# Patient Record
Sex: FEMALE | Race: BLACK OR AFRICAN AMERICAN | NOT HISPANIC OR LATINO | Employment: FULL TIME | URBAN - METROPOLITAN AREA
[De-identification: names, ages, dates, MRNs, and addresses within clinical notes are randomized per-mention and may not be internally consistent; named-entity substitution may affect disease eponyms.]

---

## 2022-07-11 ENCOUNTER — OFFICE VISIT (OUTPATIENT)
Dept: URGENT CARE | Facility: URGENT CARE | Age: 37
End: 2022-07-11
Payer: COMMERCIAL

## 2022-07-11 VITALS
SYSTOLIC BLOOD PRESSURE: 124 MMHG | DIASTOLIC BLOOD PRESSURE: 79 MMHG | TEMPERATURE: 98.1 F | WEIGHT: 166.21 LBS | HEIGHT: 64 IN | HEART RATE: 80 BPM | OXYGEN SATURATION: 100 % | BODY MASS INDEX: 28.38 KG/M2

## 2022-07-11 DIAGNOSIS — R21 RASH AND NONSPECIFIC SKIN ERUPTION: Primary | ICD-10-CM

## 2022-07-11 PROCEDURE — 99203 OFFICE O/P NEW LOW 30 MIN: CPT | Performed by: EMERGENCY MEDICINE

## 2022-07-11 RX ORDER — TRIAMCINOLONE ACETONIDE 1 MG/G
OINTMENT TOPICAL 2 TIMES DAILY
Qty: 30 G | Refills: 0 | Status: SHIPPED | OUTPATIENT
Start: 2022-07-11 | End: 2022-07-21

## 2022-07-11 NOTE — PROGRESS NOTES
Assessment & Plan     Diagnosis:    (R21) Rash and nonspecific skin eruption  (primary encounter diagnosis)  Plan: triamcinolone (KENALOG) 0.1 % external ointment      Medical Decision Making:  Azalea Damon is a 36 year old female presents today with concern of a rash on the bilateral forearms, and the back of the neck. the rash is slightly raised and scaly, non-petechial, non-pruritic.  Slightly painful; no vesicular appearance and given bilateral nature a very low suspicion for shingles at this time.  The patient is nontoxic and well-appearing.  I suspect this is a rash due to contact dermatitis environmental cause.  Swimming, insect bites or stings, signs of angioedema or urticaria to suggest allergic reaction.  Topical steroid cream and symptomatic care is recommended. Close follow-up with the patient's PMD was recommended.  I also recommended going to the ER if any worsening, high fever, sores in the mouth, difficulty breathing or any worsening.      Patient voices understanding and agreement with the plan including reasons to go to the ER immediately as well as to be seen by a more consistent care-giver, such as their PCP, if the symptoms persist more than 3 days.     Joon Kothari PA-C  [facility]    Subjective     Azalea Damon is a 36 year old female who presents to clinic today for the following health issues:  Chief Complaint   Patient presents with     Rash     Rash on arms and behind neck. Patient will also like to establish care.       HPI    Rash    Onset of rash was 1 week ago.   Course of illness is gradual onset.  Severity moderate  Current and Associated symptoms: itching, painful and red   Location of the rash: Bilateral arms and nape of neck.  Previous history of a similar rash? No  Recent exposure history: none known  Denies exposure to: environmental allergens, impetigo, medications, new household products, new skincare products and tick (deer tick), insect bites or  "stings, swimming in lakes.  Associated symptoms include: none; just the rash.    Patient denies any throat tightness, sore throat, wheezing, shortness of breath, tongue/lip swelling and fever.  Treatment measures tried include: moisturizer    Review of Systems    See HPI    Objective      Vitals: /79 (BP Location: Left arm, Patient Position: Sitting, Cuff Size: Adult Regular)   Pulse 80   Temp 98.1  F (36.7  C) (Tympanic)   Ht 1.626 m (5' 4\")   Wt 75.4 kg (166 lb 3.4 oz)   SpO2 100%   BMI 28.53 kg/m    Resp: 18    Patient Vitals for the past 24 hrs:   BP Temp Temp src Pulse SpO2 Height Weight   07/11/22 1615 124/79 98.1  F (36.7  C) Tympanic 80 100 % 1.626 m (5' 4\") 75.4 kg (166 lb 3.4 oz)       Vital signs reviewed by: Joon Kothari PA-C    Physical Exam   Constitutional: Patient is alert and cooperative. No acute distress.  Mouth: Mucous membranes are moist. Normal tongue and tonsil. Posterior oropharynx is clear.  Eyes: Conjunctivae, EOMI and lids are normal. PERRL.  Cardiovascular: Regular rate and rhythm  Pulmonary/Chest: Lungs are clear to auscultation throughout. Effort normal. No respiratory distress. No wheezes, rales or rhonchi.  Neurological: Alert and oriented x3.   Skin: Tiny papular rash on an erythematous base (blanching) over the bilateral forearms, upper arms and nape of neck.  No vesicles, fluctuance, or areas of pointing.  No petechiae, purpura.  No mucous membrane or palm involvement.  Psychiatric:The patient has a normal mood and affect.       Joon Kothari PA-C, July 11, 2022      "

## 2022-07-11 NOTE — PATIENT INSTRUCTIONS
Follow up with regular PCP in 2 weeks for recheck if not improving. Do not use the steroid cream for longer than 14 days. Go to the ER with swelling/rash of the eyes, mouth, fevers, difficulties swallowing or breathing.

## 2023-01-26 ENCOUNTER — NURSE TRIAGE (OUTPATIENT)
Dept: FAMILY MEDICINE | Facility: CLINIC | Age: 38
End: 2023-01-26
Payer: COMMERCIAL

## 2023-01-26 NOTE — TELEPHONE ENCOUNTER
"Patient calling about vaginal bleeding, states on Monday and Tuesday was soaking more than 2 pads an hour but today is having very little bleeding.    Patient is wanting to be seen today or is thinking of going to urgent care but would prefer appointment in order to figure out why she had this episode of heavy bleeding.    Reason for Disposition    Patient wants to be seen    Additional Information    Negative: SEVERE vaginal bleeding (e.g., continuous red blood from vagina, or large blood clots) and very weak (can't stand)    Negative: Passed out (i.e., fainted, collapsed and was not responding)    Negative: Difficult to awaken or acting confused (e.g., disoriented, slurred speech)    Negative: Shock suspected (e.g., cold/pale/clammy skin, too weak to stand, low BP, rapid pulse)    Negative: Sounds like a life-threatening emergency to the triager    Negative: Pregnant 20 or more weeks (5 months or more)    Negative: Pregnant < 20 weeks (less than 5 months)    Negative: Postpartum (from 0 to 6 weeks after delivery)    Negative: Vaginal discharge is main symptom and bleeding is slight    Negative: SEVERE abdominal pain (e.g., excruciating)    Negative: SEVERE dizziness (e.g., unable to stand, requires support to walk, feels like passing out now)    Negative: SEVERE vaginal bleeding (e.g., soaking 2 pads or tampons per hour and present 2 or more hours; 1 menstrual cup every 2 hours)    Negative: MODERATE vaginal bleeding (i.e., soaking pad or tampon per hour and present > 6 hours; 1 menstrual cup every 6 hours)    Negative: Pale skin (pallor) of new-onset or worsening    Negative: Constant abdominal pain lasting > 2 hours    Negative: Patient sounds very sick or weak to the triager    Answer Assessment - Initial Assessment Questions  1. AMOUNT: \"Describe the bleeding that you are having.\"     - SPOTTING: spotting, or pinkish / brownish mucous discharge; does not fill panty liner or pad     - MILD:  less than 1 pad / " "hour; less than patient's usual menstrual bleeding    - MODERATE: 1-2 pads / hour; 1 menstrual cup every 6 hours; small-medium blood clots (e.g., pea, grape, small coin)    - SEVERE: soaking 2 or more pads/hour for 2 or more hours; 1 menstrual cup every 2 hours; bleeding not contained by pads or continuous red blood from vagina; large blood clots (e.g., golf ball, large coin)       Less than 1 pad an hour today, Monday and Tuesday was more than 2 pads per hour.  2. ONSET: \"When did the bleeding begin?\" \"Is it continuing now?\"      Started 1/20, and was still bleeding yesterday but today there is almost none.  3. MENSTRUAL PERIOD: \"When was the last normal menstrual period?\" \"How is this different than your period?\"      12/28 and this is different as it was heavier on Monday and Tuesday.   4. REGULARITY: \"How regular are your periods?\"      States abnormal cycles are normal for her as far as dates changing.  5. ABDOMINAL PAIN: \"Do you have any pain?\" \"How bad is the pain?\"  (e.g., Scale 1-10; mild, moderate, or severe)    - MILD (1-3): doesn't interfere with normal activities, abdomen soft and not tender to touch     - MODERATE (4-7): interferes with normal activities or awakens from sleep, abdomen tender to touch     - SEVERE (8-10): excruciating pain, doubled over, unable to do any normal activities       None  6. PREGNANCY: \"Could you be pregnant?\" \"Are you sexually active?\" \"Did you recently give birth?\"      No  7. BREASTFEEDING: \"Are you breastfeeding?\"      No  8. HORMONES: \"Are you taking any hormone medications, prescription or OTC?\" (e.g., birth control pills, estrogen)      No  9. BLOOD THINNERS: \"Do you take any blood thinners?\" (e.g., Coumadin/warfarin, Pradaxa/dabigatran, aspirin)      No  10. CAUSE: \"What do you think is causing the bleeding?\" (e.g., recent gyn surgery, recent gyn procedure; known bleeding disorder, cervical cancer, polycystic ovarian disease, fibroids)          Unsure, doesn't feel " "it is from a menstrual cycle.  11. HEMODYNAMIC STATUS: \"Are you weak or feeling lightheaded?\" If Yes, ask: \"Can you stand and walk normally?\"         No  12. OTHER SYMPTOMS: \"What other symptoms are you having with the bleeding?\" (e.g., passed tissue, vaginal discharge, fever, menstrual-type cramps)        No    Protocols used: VAGINAL BLEEDING - [id number]    Juan F Hernandez RN   [facility]    " [facility] Counseling:  I discussed with the patient the risks of golimumab including but not limited to myelosuppression, immunosuppression, autoimmune hepatitis, demyelinating diseases, lymphoma, and serious infections.  The patient understands that monitoring is required including a PPD at baseline and must alert us or the primary physician if symptoms of infection or other concerning signs are noted.

## 2023-01-26 NOTE — TELEPHONE ENCOUNTER
RN calling patient. Patient has not been seen at the [facility] and does not currently have a PCP. RN unable to get patient scheduled for sooner visit at this time.     Advised patient to keep visit on 2/23/23 with Dr. Blackburn. RN advised patient to be seen in urgent care with current concerns. RN advised patient to be seen in the emergency room if having severe bleeding. Patient verbalizes understanding and denies further concerns at this time.     OLIVIA HollisN, RN  [facility]

## 2023-05-01 ENCOUNTER — TELEPHONE (OUTPATIENT)
Dept: FAMILY MEDICINE | Facility: CLINIC | Age: 38
End: 2023-05-01
Payer: COMMERCIAL
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
## 2023-05-01 NOTE — TELEPHONE ENCOUNTER
I have never seen this patient. She will have to schedule with any available provider.  Thank you,  Malgorzata Taveras MD MPH

## 2023-05-04 ENCOUNTER — OFFICE VISIT (OUTPATIENT)
Dept: FAMILY MEDICINE | Facility: CLINIC | Age: 38
End: 2023-05-04
Payer: COMMERCIAL

## 2023-05-04 VITALS
SYSTOLIC BLOOD PRESSURE: 112 MMHG | RESPIRATION RATE: 14 BRPM | WEIGHT: 161 LBS | DIASTOLIC BLOOD PRESSURE: 71 MMHG | BODY MASS INDEX: 27.49 KG/M2 | OXYGEN SATURATION: 100 % | HEIGHT: 64 IN | TEMPERATURE: 98.8 F | HEART RATE: 79 BPM

## 2023-05-04 DIAGNOSIS — M25.561 ACUTE PAIN OF BOTH KNEES: ICD-10-CM

## 2023-05-04 DIAGNOSIS — R07.89 CHEST WALL PAIN: ICD-10-CM

## 2023-05-04 DIAGNOSIS — S60.222D CONTUSION OF LEFT HAND, SUBSEQUENT ENCOUNTER: ICD-10-CM
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
## 2023-05-04 DIAGNOSIS — M25.562 ACUTE PAIN OF BOTH KNEES: ICD-10-CM

## 2023-05-04 PROCEDURE — 99213 OFFICE O/P EST LOW 20 MIN: CPT | Performed by: FAMILY MEDICINE

## 2023-05-04 ASSESSMENT — PATIENT HEALTH QUESTIONNAIRE - PHQ9
SUM OF ALL RESPONSES TO PHQ QUESTIONS 1-9: 15
SUM OF ALL RESPONSES TO PHQ QUESTIONS 1-9: 15
10. IF YOU CHECKED OFF ANY PROBLEMS, HOW DIFFICULT HAVE THESE PROBLEMS MADE IT FOR YOU TO DO YOUR WORK, TAKE CARE OF THINGS AT HOME, OR GET ALONG WITH OTHER PEOPLE: EXTREMELY DIFFICULT

## 2023-05-04 ASSESSMENT — PAIN SCALES - GENERAL: PAINLEVEL: EXTREME PAIN (8)
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
## 2023-05-04 NOTE — PATIENT INSTRUCTIONS
At [facility], we strive to deliver an exceptional experience to you, every time we see you. If you receive a survey, please complete it as we do value your feedback.  If you have MyChart, you can expect to receive results automatically within 24 hours of their completion.  Your provider will send a note interpreting your results as well.   If you do not have MyChart, you should receive your results in about a week by mail.    Your care team:                            Family Medicine Internal Medicine   MD Surjit Kevin MD Shantel Branch-Fleming, MD Srinivasa Vaka, MD Katya Belousova, PARHEA Richards CNP, MD ([facility]   Rodrick Acevedo, MD Christine Talley MD Amelia Massimini APRN IMELDA Soler APRN MD Abdirashid Gnosalves MD          Clinic hours: Monday - Thursday 7 am-6 pm; Fridays 7 am-5 pm.   Urgent care: Monday - Friday 10 am- 8 pm; Saturday and Sunday 9 am-5 pm.    Clinic: [phone]       [facility]: Monday - Thursday 8 am - 7 pm; Friday 8 am - 6 pm  [facility]: [phone]